# Patient Record
Sex: FEMALE | Race: WHITE | URBAN - METROPOLITAN AREA
[De-identification: names, ages, dates, MRNs, and addresses within clinical notes are randomized per-mention and may not be internally consistent; named-entity substitution may affect disease eponyms.]

---

## 2017-07-05 ENCOUNTER — HOSPITAL ENCOUNTER (OUTPATIENT)
Facility: CLINIC | Age: 82
Discharge: HOME | End: 2017-07-05
Attending: OPHTHALMOLOGY | Admitting: OPHTHALMOLOGY
Payer: COMMERCIAL

## 2017-07-05 ENCOUNTER — ANESTHESIA (OUTPATIENT)
Dept: SURGERY | Facility: CLINIC | Age: 82
End: 2017-07-05
Payer: COMMERCIAL

## 2017-07-05 ENCOUNTER — ANESTHESIA EVENT (OUTPATIENT)
Dept: SURGERY | Facility: CLINIC | Age: 82
End: 2017-07-05
Payer: COMMERCIAL

## 2017-07-05 VITALS
HEART RATE: 80 BPM | SYSTOLIC BLOOD PRESSURE: 110 MMHG | DIASTOLIC BLOOD PRESSURE: 78 MMHG | TEMPERATURE: 97.6 F | OXYGEN SATURATION: 95 % | RESPIRATION RATE: 14 BRPM

## 2017-07-05 DIAGNOSIS — H40.1113 PRIMARY OPEN ANGLE GLAUCOMA OF RIGHT EYE, SEVERE STAGE: Primary | ICD-10-CM

## 2017-07-05 PROCEDURE — 27210794 ZZH OR GENERAL SUPPLY STERILE: Performed by: OPHTHALMOLOGY

## 2017-07-05 PROCEDURE — S0020 INJECTION, BUPIVICAINE HYDRO: HCPCS | Performed by: OPHTHALMOLOGY

## 2017-07-05 PROCEDURE — 25000125 ZZHC RX 250: Performed by: ANESTHESIOLOGY

## 2017-07-05 PROCEDURE — 27210995 ZZH RX 272: Performed by: OPHTHALMOLOGY

## 2017-07-05 PROCEDURE — 36000105 ZZH EYE SURGERY LEVEL 4 EA 15 ADDTL MIN: Performed by: OPHTHALMOLOGY

## 2017-07-05 PROCEDURE — 25000128 H RX IP 250 OP 636: Performed by: NURSE ANESTHETIST, CERTIFIED REGISTERED

## 2017-07-05 PROCEDURE — 25000128 H RX IP 250 OP 636

## 2017-07-05 PROCEDURE — C1783 OCULAR IMP, AQUEOUS DRAIN DE: HCPCS | Performed by: OPHTHALMOLOGY

## 2017-07-05 PROCEDURE — 25000128 H RX IP 250 OP 636: Performed by: OPHTHALMOLOGY

## 2017-07-05 PROCEDURE — 25000125 ZZHC RX 250: Performed by: OPHTHALMOLOGY

## 2017-07-05 PROCEDURE — 36000104 ZZH EYE SURGERY LEVEL 4 1ST 30 MIN: Performed by: OPHTHALMOLOGY

## 2017-07-05 PROCEDURE — 37000009 ZZH ANESTHESIA TECHNICAL FEE, EACH ADDTL 15 MIN: Performed by: OPHTHALMOLOGY

## 2017-07-05 PROCEDURE — 37000008 ZZH ANESTHESIA TECHNICAL FEE, 1ST 30 MIN: Performed by: OPHTHALMOLOGY

## 2017-07-05 PROCEDURE — 40000170 ZZH STATISTIC PRE-PROCEDURE ASSESSMENT II: Performed by: OPHTHALMOLOGY

## 2017-07-05 PROCEDURE — 71000028 ZZH EYE RECOVERY PHASE 2 EACH 15 MINS: Performed by: OPHTHALMOLOGY

## 2017-07-05 DEVICE — EYE SHUNT EX-PRESS P MINI GLAUCOMA 50UM P-50PL: Type: IMPLANTABLE DEVICE | Site: EYE | Status: FUNCTIONAL

## 2017-07-05 RX ORDER — SODIUM CHLORIDE, SODIUM LACTATE, POTASSIUM CHLORIDE, CALCIUM CHLORIDE 600; 310; 30; 20 MG/100ML; MG/100ML; MG/100ML; MG/100ML
INJECTION, SOLUTION INTRAVENOUS CONTINUOUS
Status: CANCELLED | OUTPATIENT
Start: 2017-07-05

## 2017-07-05 RX ORDER — ONDANSETRON 2 MG/ML
INJECTION INTRAMUSCULAR; INTRAVENOUS PRN
Status: DISCONTINUED | OUTPATIENT
Start: 2017-07-05 | End: 2017-07-05

## 2017-07-05 RX ORDER — MOXIFLOXACIN 5 MG/ML
1 SOLUTION/ DROPS OPHTHALMIC 4 TIMES DAILY
Qty: 3 ML | Refills: 0 | Status: SHIPPED | OUTPATIENT
Start: 2017-07-05 | End: 2017-07-20

## 2017-07-05 RX ORDER — ATROPINE SULFATE 10 MG/ML
SOLUTION/ DROPS OPHTHALMIC PRN
Status: DISCONTINUED | OUTPATIENT
Start: 2017-07-05 | End: 2017-07-05 | Stop reason: HOSPADM

## 2017-07-05 RX ORDER — TETRACAINE HYDROCHLORIDE 5 MG/ML
SOLUTION OPHTHALMIC PRN
Status: DISCONTINUED | OUTPATIENT
Start: 2017-07-05 | End: 2017-07-05 | Stop reason: HOSPADM

## 2017-07-05 RX ORDER — BALANCED SALT SOLUTION 6.4; .75; .48; .3; 3.9; 1.7 MG/ML; MG/ML; MG/ML; MG/ML; MG/ML; MG/ML
SOLUTION OPHTHALMIC PRN
Status: DISCONTINUED | OUTPATIENT
Start: 2017-07-05 | End: 2017-07-05 | Stop reason: HOSPADM

## 2017-07-05 RX ORDER — DEXAMETHASONE SODIUM PHOSPHATE 10 MG/ML
INJECTION, SOLUTION INTRAMUSCULAR; INTRAVENOUS PRN
Status: DISCONTINUED | OUTPATIENT
Start: 2017-07-05 | End: 2017-07-05 | Stop reason: HOSPADM

## 2017-07-05 RX ORDER — PREDNISOLONE ACETATE 10 MG/ML
1 SUSPENSION/ DROPS OPHTHALMIC 4 TIMES DAILY
Qty: 10 ML | Refills: 2 | Status: ON HOLD | OUTPATIENT
Start: 2017-07-05 | End: 2017-09-28

## 2017-07-05 RX ORDER — PHENYLEPHRINE HYDROCHLORIDE 25 MG/ML
1 SOLUTION/ DROPS OPHTHALMIC
Status: COMPLETED | OUTPATIENT
Start: 2017-07-05 | End: 2017-07-05

## 2017-07-05 RX ORDER — SODIUM CHLORIDE, SODIUM LACTATE, POTASSIUM CHLORIDE, CALCIUM CHLORIDE 600; 310; 30; 20 MG/100ML; MG/100ML; MG/100ML; MG/100ML
INJECTION, SOLUTION INTRAVENOUS CONTINUOUS
Status: DISCONTINUED | OUTPATIENT
Start: 2017-07-05 | End: 2017-07-05 | Stop reason: HOSPADM

## 2017-07-05 RX ADMIN — PHENYLEPHRINE HYDROCHLORIDE 1 DROP: 2.5 SOLUTION/ DROPS OPHTHALMIC at 07:59

## 2017-07-05 RX ADMIN — MIDAZOLAM HYDROCHLORIDE 0.5 MG: 1 INJECTION, SOLUTION INTRAMUSCULAR; INTRAVENOUS at 09:55

## 2017-07-05 RX ADMIN — MIDAZOLAM HYDROCHLORIDE 0.5 MG: 1 INJECTION, SOLUTION INTRAMUSCULAR; INTRAVENOUS at 10:14

## 2017-07-05 RX ADMIN — MIDAZOLAM HYDROCHLORIDE 1 MG: 1 INJECTION, SOLUTION INTRAMUSCULAR; INTRAVENOUS at 09:50

## 2017-07-05 RX ADMIN — PHENYLEPHRINE HYDROCHLORIDE 1 DROP: 2.5 SOLUTION/ DROPS OPHTHALMIC at 08:12

## 2017-07-05 RX ADMIN — ONDANSETRON 4 MG: 2 INJECTION INTRAMUSCULAR; INTRAVENOUS at 09:50

## 2017-07-05 RX ADMIN — PHENYLEPHRINE HYDROCHLORIDE 1 DROP: 2.5 SOLUTION/ DROPS OPHTHALMIC at 08:06

## 2017-07-05 RX ADMIN — LIDOCAINE HYDROCHLORIDE 1 ML: 10 INJECTION, SOLUTION EPIDURAL; INFILTRATION; INTRACAUDAL; PERINEURAL at 08:12

## 2017-07-05 RX ADMIN — SODIUM CHLORIDE, POTASSIUM CHLORIDE, SODIUM LACTATE AND CALCIUM CHLORIDE: 600; 310; 30; 20 INJECTION, SOLUTION INTRAVENOUS at 08:14

## 2017-07-05 ASSESSMENT — ENCOUNTER SYMPTOMS: DYSRHYTHMIAS: 1

## 2017-07-05 NOTE — ANESTHESIA PREPROCEDURE EVALUATION
Anesthesia Evaluation     . Pt has had prior anesthetic.     No history of anesthetic complications          ROS/MED HX    ENT/Pulmonary:      (-) sleep apnea   Neurologic:       Cardiovascular:     (+) ----. : . . . :. dysrhythmias a-fib, .       METS/Exercise Tolerance:     Hematologic:         Musculoskeletal:         GI/Hepatic:     (+) GERD Asymptomatic on medication,       Renal/Genitourinary:         Endo:     (+) thyroid problem  hyperthyroidism, .      Psychiatric:         Infectious Disease:         Malignancy:   (+) Malignancy History of Other  Other CA endometrial cancer Remission status post Surgery and Radiation         Other:                     Physical Exam  Normal systems: dental    Airway   Mallampati: I  TM distance: >3 FB  Neck ROM: full    Dental     Cardiovascular   Rhythm and rate: irregular and normal      Pulmonary    breath sounds clear to auscultation                    Anesthesia Plan      History & Physical Review  History and physical reviewed and following examination; no interval change.    ASA Status:  3 .    NPO Status:  > 8 hours    Plan for MAC with Intravenous induction. Reason for MAC:  Procedure to face, neck, head or breast         Postoperative Care      Consents  Anesthetic plan, risks, benefits and alternatives discussed with:  Patient..                          .

## 2017-07-05 NOTE — DISCHARGE INSTRUCTIONS
[facility]est[facility] Discharge  Instructions  Anesthesia (Eye Care Center)   Adult Discharge Instructions    For 24 hours after surgery    1. Get plenty of rest.  Make arrangements to have a responsible adult stay with you for at least 6 hours after you leave the hospital.  2. Do not drive or use heavy equipment for 24 hours.    3. Do not drink alcohol for 24 hours.  4. Do not sign legal documents or make important decisions for 24 hours.  5. Avoid strenuous or risky activities. You may feel lightheaded.  If so, sit for a few minutes before standing.  Have someone help you get up.   6. Conscious sedation patients may resume a regular diet..  7. Any questions of medical nature, call your physician.        Discharge Instructions  Post-Operative Glaucoma Surgery  Dr. Melvin  [phone]    Pain Management:      Some mild discomfort is normal following surgery.  If you are currently taking any medications for pain, you may continue to take what you normally do.  Otherwise, you may take an over-the-counter medication such as Tylenol (acetaminophen) or ibuprofen (Advil) for relief.  Take as instructed on the bottle.    If you are experiencing uncontrollable pain or have other concerns, call [phone].    Other Medications:    No eye drops to the operative eye tonight.  Please remember to bring all your drops/supplies with you to your post-op appointment.  You will be instructed on the drops at that time.    Do not take glaucoma pills.    You may resume your regular home medications, including any drops you are using in your NON-operative eye.      General Rules:    Avoid strenuous activity. Do not do anything that would cause you to strain or make your face red. Open your mouth if you cough, use a laxative if necessary, do not lift greater than 5 pounds.    Keep your head above your heart.  Do not bend down lower than waist level.  Bend with your knees.    Keep the bandage over your eye.  The  doctor will remove the bandage at your appointment at the clinic.    Try to write down any questions you may have to bring to your post-op appointment.    Be restful today.

## 2017-07-05 NOTE — ANESTHESIA CARE TRANSFER NOTE
Patient: Anastasiia Eng    Procedure(s):  RIGHT EYE EXPRESS SHUNT WITH MITOMYCIN - Wound Class: I-Clean    Diagnosis: GLAUCOMA RIGHT EYE   Diagnosis Additional Information: No value filed.    Anesthesia Type:   MAC     Note:  Airway :Room Air  Patient transferred to:Phase II        Vitals: (Last set prior to Anesthesia Care Transfer)    CRNA VITALS  7/5/2017 1007 - 7/5/2017 1040      7/5/2017             Resp Rate (set): 10                Electronically Signed By: GRACIA Bray CRNA  July 5, 2017  10:40 AM

## 2017-07-05 NOTE — IP AVS SNAPSHOT
[facility]    [address]    [place] [address]    Phone:  [phone]    Fax:  [phone]                                       After Visit Summary   7/5/2017    Anastasiia Eng    MRN: [id number]           After Visit Summary Signature Page     I have received my discharge instructions, and my questions have been answered. I have discussed any challenges I see with this plan with the nurse or doctor.    ..........................................................................................................................................  Patient/Patient Representative Signature      ..........................................................................................................................................  Patient Representative Print Name and Relationship to Patient    ..................................................               ................................................  Date                                            Time    ..........................................................................................................................................  Reviewed by Signature/Title    ...................................................              ..............................................  Date                                                            Time

## 2017-07-05 NOTE — IP AVS SNAPSHOT
MRN:[id number]                      After Visit Summary   7/5/2017    Anastasiia Eng    MRN: [id number]           Thank you!     Thank you for choosing [facility] for your care. Our goal is always to provide you with excellent care. Hearing back from our patients is one way we can continue to improve our services. Please take a few minutes to complete the written survey that you may receive in the mail after you visit with us. Thank you!        Patient Information     Date Of Birth          10/13/1932        About your hospital stay     You were admitted on:  July 5, 2017 You last received care in the:  [facility]    You were discharged on:  July 5, 2017       Who to Call     For medical emergencies, please call 911.  For non-urgent questions about your medical care, please call your primary care provider or clinic, [phone]  For questions related to your surgery, please call your surgery clinic        Attending Provider     Provider Diana Mccollum MD Ophthalmology       Primary Care Provider Office Phone # Fax #    Becka Garner [phone]      After Care Instructions     Eye drops as prescribed by physician.  Start drops today unless told otherwise by the physician           May use Tylenol or Advil for pain as directed by the physician           Notify Physician if you have severe headache or nausea           Remove patch per physician instruction           Return to clinic as instructed by physician           Wear eye shield or patch as directed                 Further instructions from your care team       [facility] Anesthesia [facility] Discharge  Instructions  Anesthesia (Eye Care Center)   Adult Discharge Instructions    For 24 hours after surgery    1. Get plenty of rest.  Make arrangements to have a responsible adult stay with you for at least 6 hours after you leave the hospital.  2. Do not drive or use heavy  equipment for 24 hours.    3. Do not drink alcohol for 24 hours.  4. Do not sign legal documents or make important decisions for 24 hours.  5. Avoid strenuous or risky activities. You may feel lightheaded.  If so, sit for a few minutes before standing.  Have someone help you get up.   6. Conscious sedation patients may resume a regular diet..  7. Any questions of medical nature, call your physician.        Discharge Instructions  Post-Operative Glaucoma Surgery  Dr. Melvin  [phone]    Pain Management:      Some mild discomfort is normal following surgery.  If you are currently taking any medications for pain, you may continue to take what you normally do.  Otherwise, you may take an over-the-counter medication such as Tylenol (acetaminophen) or ibuprofen (Advil) for relief.  Take as instructed on the bottle.    If you are experiencing uncontrollable pain or have other concerns, call [phone].    Other Medications:    No eye drops to the operative eye tonight.  Please remember to bring all your drops/supplies with you to your post-op appointment.  You will be instructed on the drops at that time.    Do not take glaucoma pills.    You may resume your regular home medications, including any drops you are using in your NON-operative eye.      General Rules:    Avoid strenuous activity. Do not do anything that would cause you to strain or make your face red. Open your mouth if you cough, use a laxative if necessary, do not lift greater than 5 pounds.    Keep your head above your heart.  Do not bend down lower than waist level.  Bend with your knees.    Keep the bandage over your eye.  The doctor will remove the bandage at your appointment at the clinic.    Try to write down any questions you may have to bring to your post-op appointment.    Be restful today.    Pending Results     No orders found from 7/3/2017 to 7/6/2017.            Admission Information     Date & Time Provider Department Dept. Phone     "2017 Diana Melvin MD [facility] [place] [phone]      Your Vitals Were     Blood Pressure Pulse Temperature Respirations Pulse Oximetry       99/58 67 97.6  F (36.4  C) (Temporal) 14 96%       MyChart Information     [facility] lets you send messages to your doctor, view your test results, renew your prescriptions, schedule appointments and more. To sign up, go to [email or web address] . Click on \"Log in\" on the left side of the screen, which will take you to the Welcome page. Then click on \"Sign up Now\" on the right side of the page.     You will be asked to enter the access code listed below, as well as some personal information. Please follow the directions to create your username and password.     Your access code is: [email or web address]  Expires: 10/3/2017 10:47 AM     Your access code will  in 90 days. If you need help or a new code, please call your [place] clinic or [phone].        Care EveryWhere ID     This is your Care EveryWhere ID. This could be used by other organizations to access your [place] medical records  [id number]        Equal Access to Services     KANCHAN ROBLES AH: Hadii jameson steiner hadasho Soomaali, waaxda luqadaha, qaybta kaalmada adeegyada, waxay tori bhatia. So [facility] [phone].    ATENCIÓN: Si habla español, tiene a harmon disposición servicios gratuitos de asistencia lingüística. Llame al [phone].    We comply with applicable federal civil rights laws and [place] laws. We do not discriminate on the basis of race, color, national origin, age, disability sex, sexual orientation or gender identity.               Review of your medicines      UNREVIEWED medicines. Ask your doctor about these medicines        Dose / Directions    COSOPT 2-0.5 % ophthalmic solution   Generic drug:  dorzolamide-timolol        Dose:  1 drop   1 drop 2 times daily. To affected eye   Refills:  0       EFFEXOR XR PO        Dose:  75 mg   Take 75 mg by " mouth. Daily with food   Refills:  0       LUMIGAN 0.03 % ophthalmic solution   Generic drug:  bimatoprost        Dose:  1 drop   1 drop At Bedtime. Every evening to affected eye; 1 drop   Refills:  0       omeprazole 20 MG tablet        Dose:  20 mg   Take 20 mg by mouth daily.   Refills:  0         START taking        Dose / Directions    moxifloxacin 0.5 % ophthalmic solution   Commonly known as:  VIGAMOX   Used for:  Primary open angle glaucoma of right eye, severe stage        Dose:  1 drop   Place 1 drop into the right eye 4 times daily for 15 days   Quantity:  3 mL   Refills:  0       prednisoLONE acetate 1 % ophthalmic susp   Commonly known as:  PRED FORTE   Used for:  Primary open angle glaucoma of right eye, severe stage        Dose:  1 drop   Place 1 drop into the right eye 4 times daily   Quantity:  10 mL   Refills:  2            Where to get your medicines      These medications were sent to [facility] [place], [place] - [address]  [address], [place] [address]     Phone:  [phone]     moxifloxacin 0.5 % ophthalmic solution    prednisoLONE acetate 1 % ophthalmic susp                Protect others around you: Learn how to safely use, store and throw away your medicines at www.disposemymeds.org.             Medication List: This is a list of all your medications and when to take them. Check marks below indicate your daily home schedule. Keep this list as a reference.      Medications           Morning Afternoon Evening Bedtime As Needed    COSOPT 2-0.5 % ophthalmic solution   1 drop 2 times daily. To affected eye   Generic drug:  dorzolamide-timolol                                EFFEXOR XR PO   Take 75 mg by mouth. Daily with food                                LUMIGAN 0.03 % ophthalmic solution   1 drop At Bedtime. Every evening to affected eye; 1 drop   Generic drug:  bimatoprost                                moxifloxacin 0.5 % ophthalmic solution   Commonly known  as:  VIGAMOX   Place 1 drop into the right eye 4 times daily for 15 days                                omeprazole 20 MG tablet   Take 20 mg by mouth daily.                                prednisoLONE acetate 1 % ophthalmic susp   Commonly known as:  PRED FORTE   Place 1 drop into the right eye 4 times daily

## 2017-07-05 NOTE — OP NOTE
Patient:      Anastasiia Eng                                               Reg No:     [id number]                                               Date:          2017                                               :          10/13/1932                                                  Attending:   HOLLIE LOZANO M.D.                                                Surgeon:     HOLLIE LOZANO M.D.                                                Dictating:    HOLLIE LOZANO M.D.                                                Service Dt: Ophthalmology                                      OPERATIVE REPORT          ANESTHESIA:   Monitored anesthetic care with local and subTenon's block of mixture of two mls of lidocaine 2% and two mls of Marcaine 0.75% and hyaluronidase injected into superior subTenon s space.      PREOPERATIVE DIAGNOSIS (ES):   OAG, right eye     POSTOPERATIVE DIAGNOSIS (ES):   The same     NAME OF OPERATION:   1. Express shunt, model P-50, serial number [id number], with a mixture of 0.1 cc of mitomycin 0.2 mg/cc and 0.1 cc of sub-tenon's block, right eye     COMPLICATIONS:  None.      SPECIMENS REMOVED:   None.     INDICATIONS FOR PROCEDURE:  The patient is a 84 year old female with unsatisfactory controlled intraocular pressure on maximum medical treatment and progressive visual field loss. The benefits, alternatives and risks of the procedure including the risk of infection, inflammation, bleeding, blindness, need for another procedure or additional glaucoma medications, hypotony, elevated intraocular pressure were discussed with the patient. The patient understood the benefits, alternatives and risks associated with the procedure and has elected to proceed with the surgery.     DESCRIPTION OF PROCEDURE:     After the appropriate pre-operative consent was obtained the patient was taken to the operating room and hooked up to cardiorespiratory  monitoring by the anesthesia team.  Monitored anesthetic care was provided by the anesthesia team. The time out was taken to identify the patient, the surgery, the implant and the eye. The patient was draped in usual sterile ophthalmic fashion. Tetracaine drops were instilled into the eye. Lid speculum was placed into the eye. SubTenon block was given into inferior-nasal subtenon s space.      6-0 silk traction suture was placed into superior cornea and the eye was infraducted. The 6-0 silk suture was secured to the sterile drape with hemostat. Conjunctival peritomy was performed utilizing straight smooth forceps and Sheela's scissors 10 millimeter superior to the limbus. The conjunctiva and Tenon s were dissected towards limbus. Cautery was used to archive hemostasis.  [place] blade was used to create a partial-thickness scleral flap at the limbus. Nasal paracentesis was performed with 25 gauge needle on a TB syringe. The AC was filled with healon. 25 gauge needle on a TB syringe was used to create an entry for the Ex-PRESS shunt in to the AC. The Ex-PRESS shunt was inserted into the AC. The flap was re-positioned and secured to the sclera with two 10-0 nylon sutures, one at each corner. The AC was re-filled with healon GV. Ologen was placed on top of the scleral flap. Conjunctiva and Tenon s were closed with 8-0 vycryl. A mixture of mitomycin and sub-tenon's block was injected into superior sub-tenon's space.  At the end of the surgery the AC was deep, the bleb was forming and no leak was identified. Ceftazidime and dexamethazone injections were given in sub-Tenon's space. The lid speculum was removed. Maxitrol ointment waw placed in the eye. The eye was patched and shielded in standard ophthalmic fashion.     DISPOSITION: The patient was taken to the recovery room in stable condition having tolerated the procedure well. The patient will be given post-operative instructions and seen in the eye clinic  tomorrow.    SPONGE/INSTRUMENT/NEEDLE COUNTS:   All sponge and needle counts were correct at the end of the case.     ____________________________   Diana LOZANO M.D.

## 2017-07-05 NOTE — ANESTHESIA POSTPROCEDURE EVALUATION
Patient: Anastasiia Eng    Procedure(s):  RIGHT EYE EXPRESS SHUNT WITH MITOMYCIN - Wound Class: I-Clean    Diagnosis:GLAUCOMA RIGHT EYE   Diagnosis Additional Information: No value filed.    Anesthesia Type:  MAC    Note:  Anesthesia Post Evaluation    Patient location during evaluation: PACU  Patient participation: Able to fully participate in evaluation  Level of consciousness: awake  Pain management: adequate  Airway patency: patent  Cardiovascular status: acceptable  Respiratory status: acceptable  Hydration status: acceptable  PONV: none     Anesthetic complications: None          Last vitals:  Vitals:    07/05/17 0802 07/05/17 1042   BP: 116/50 99/58   Pulse:  67   Resp: 16 14   Temp: 36.4  C (97.6  F)    SpO2: 98% 96%         Electronically Signed By: Yudy June MD  July 5, 2017  10:48 AM

## 2017-09-26 RX ORDER — LATANOPROST 50 UG/ML
1 SOLUTION/ DROPS OPHTHALMIC AT BEDTIME
COMMUNITY

## 2017-09-26 RX ORDER — CHOLESTYRAMINE 4 G/9G
1 POWDER, FOR SUSPENSION ORAL DAILY
COMMUNITY

## 2017-09-26 RX ORDER — ESTRADIOL 0.1 MG/G
2 CREAM VAGINAL
COMMUNITY

## 2017-09-26 RX ORDER — BRIMONIDINE TARTRATE AND TIMOLOL MALEATE 2; 5 MG/ML; MG/ML
1 SOLUTION OPHTHALMIC 2 TIMES DAILY
Status: ON HOLD | COMMUNITY
End: 2018-02-12

## 2017-09-26 RX ORDER — CYANOCOBALAMIN 1000 UG/ML
1 INJECTION, SOLUTION INTRAMUSCULAR; SUBCUTANEOUS
COMMUNITY

## 2017-09-28 ENCOUNTER — ANESTHESIA EVENT (OUTPATIENT)
Dept: SURGERY | Facility: CLINIC | Age: 82
End: 2017-09-28
Payer: COMMERCIAL

## 2017-09-28 ENCOUNTER — SURGERY (OUTPATIENT)
Age: 82
End: 2017-09-28

## 2017-09-28 ENCOUNTER — ANESTHESIA (OUTPATIENT)
Dept: SURGERY | Facility: CLINIC | Age: 82
End: 2017-09-28
Payer: COMMERCIAL

## 2017-09-28 ENCOUNTER — HOSPITAL ENCOUNTER (OUTPATIENT)
Facility: CLINIC | Age: 82
Discharge: HOME | End: 2017-09-28
Attending: OPHTHALMOLOGY | Admitting: OPHTHALMOLOGY
Payer: COMMERCIAL

## 2017-09-28 VITALS
HEIGHT: 62 IN | RESPIRATION RATE: 16 BRPM | BODY MASS INDEX: 26.91 KG/M2 | WEIGHT: 146.2 LBS | TEMPERATURE: 98.1 F | OXYGEN SATURATION: 97 % | SYSTOLIC BLOOD PRESSURE: 139 MMHG | DIASTOLIC BLOOD PRESSURE: 68 MMHG

## 2017-09-28 DIAGNOSIS — H40.1113 PRIMARY OPEN ANGLE GLAUCOMA OF RIGHT EYE, SEVERE STAGE: Primary | ICD-10-CM

## 2017-09-28 PROCEDURE — 25000125 ZZHC RX 250: Performed by: ANESTHESIOLOGY

## 2017-09-28 PROCEDURE — C1783 OCULAR IMP, AQUEOUS DRAIN DE: HCPCS | Performed by: OPHTHALMOLOGY

## 2017-09-28 PROCEDURE — 40000170 ZZH STATISTIC PRE-PROCEDURE ASSESSMENT II: Performed by: OPHTHALMOLOGY

## 2017-09-28 PROCEDURE — 27210794 ZZH OR GENERAL SUPPLY STERILE: Performed by: OPHTHALMOLOGY

## 2017-09-28 PROCEDURE — 36000098 ZZH EYE SURGERY LEVEL 2 1ST 30 MIN: Performed by: OPHTHALMOLOGY

## 2017-09-28 PROCEDURE — 25000128 H RX IP 250 OP 636: Performed by: NURSE ANESTHETIST, CERTIFIED REGISTERED

## 2017-09-28 PROCEDURE — 25000128 H RX IP 250 OP 636: Performed by: OPHTHALMOLOGY

## 2017-09-28 PROCEDURE — 25000125 ZZHC RX 250: Performed by: NURSE ANESTHETIST, CERTIFIED REGISTERED

## 2017-09-28 PROCEDURE — 25000128 H RX IP 250 OP 636: Performed by: ANESTHESIOLOGY

## 2017-09-28 PROCEDURE — S0020 INJECTION, BUPIVICAINE HYDRO: HCPCS | Performed by: OPHTHALMOLOGY

## 2017-09-28 PROCEDURE — 71000028 ZZH EYE RECOVERY PHASE 2 EACH 15 MINS: Performed by: OPHTHALMOLOGY

## 2017-09-28 PROCEDURE — 37000008 ZZH ANESTHESIA TECHNICAL FEE, 1ST 30 MIN: Performed by: OPHTHALMOLOGY

## 2017-09-28 PROCEDURE — 25000125 ZZHC RX 250: Performed by: OPHTHALMOLOGY

## 2017-09-28 PROCEDURE — 37000009 ZZH ANESTHESIA TECHNICAL FEE, EACH ADDTL 15 MIN: Performed by: OPHTHALMOLOGY

## 2017-09-28 PROCEDURE — 27210995 ZZH RX 272: Performed by: OPHTHALMOLOGY

## 2017-09-28 PROCEDURE — 36000099 ZZH EYE SURGERY LEVEL 2 EA 15 ADDTL MIN: Performed by: OPHTHALMOLOGY

## 2017-09-28 DEVICE — IMP EYE OLOGEN COLLAGEN MATRIX AEON 12MMX1MM 862051: Type: IMPLANTABLE DEVICE | Site: EYE | Status: FUNCTIONAL

## 2017-09-28 RX ORDER — ACETAMINOPHEN 650 MG/1
650 SUPPOSITORY RECTAL EVERY 4 HOURS PRN
Status: DISCONTINUED | OUTPATIENT
Start: 2017-09-28 | End: 2017-09-28 | Stop reason: HOSPADM

## 2017-09-28 RX ORDER — SODIUM CHLORIDE, SODIUM LACTATE, POTASSIUM CHLORIDE, CALCIUM CHLORIDE 600; 310; 30; 20 MG/100ML; MG/100ML; MG/100ML; MG/100ML
INJECTION, SOLUTION INTRAVENOUS CONTINUOUS
Status: DISCONTINUED | OUTPATIENT
Start: 2017-09-28 | End: 2017-09-28 | Stop reason: HOSPADM

## 2017-09-28 RX ORDER — DEXAMETHASONE SODIUM PHOSPHATE 4 MG/ML
INJECTION, SOLUTION INTRA-ARTICULAR; INTRALESIONAL; INTRAMUSCULAR; INTRAVENOUS; SOFT TISSUE PRN
Status: DISCONTINUED | OUTPATIENT
Start: 2017-09-28 | End: 2017-09-28 | Stop reason: HOSPADM

## 2017-09-28 RX ORDER — ONDANSETRON 2 MG/ML
4 INJECTION INTRAMUSCULAR; INTRAVENOUS EVERY 30 MIN PRN
Status: DISCONTINUED | OUTPATIENT
Start: 2017-09-28 | End: 2017-09-28 | Stop reason: HOSPADM

## 2017-09-28 RX ORDER — FENTANYL CITRATE 50 UG/ML
25-50 INJECTION, SOLUTION INTRAMUSCULAR; INTRAVENOUS
Status: DISCONTINUED | OUTPATIENT
Start: 2017-09-28 | End: 2017-09-28 | Stop reason: HOSPADM

## 2017-09-28 RX ORDER — ONDANSETRON 4 MG/1
4 TABLET, ORALLY DISINTEGRATING ORAL EVERY 30 MIN PRN
Status: DISCONTINUED | OUTPATIENT
Start: 2017-09-28 | End: 2017-09-28 | Stop reason: HOSPADM

## 2017-09-28 RX ORDER — SODIUM CHLORIDE, SODIUM LACTATE, POTASSIUM CHLORIDE, CALCIUM CHLORIDE 600; 310; 30; 20 MG/100ML; MG/100ML; MG/100ML; MG/100ML
500 INJECTION, SOLUTION INTRAVENOUS CONTINUOUS
Status: DISCONTINUED | OUTPATIENT
Start: 2017-09-28 | End: 2017-09-28 | Stop reason: HOSPADM

## 2017-09-28 RX ORDER — NALOXONE HYDROCHLORIDE 0.4 MG/ML
.1-.4 INJECTION, SOLUTION INTRAMUSCULAR; INTRAVENOUS; SUBCUTANEOUS
Status: DISCONTINUED | OUTPATIENT
Start: 2017-09-28 | End: 2017-09-28 | Stop reason: HOSPADM

## 2017-09-28 RX ORDER — ONDANSETRON 2 MG/ML
INJECTION INTRAMUSCULAR; INTRAVENOUS PRN
Status: DISCONTINUED | OUTPATIENT
Start: 2017-09-28 | End: 2017-09-28

## 2017-09-28 RX ORDER — TETRACAINE HYDROCHLORIDE 5 MG/ML
SOLUTION OPHTHALMIC PRN
Status: DISCONTINUED | OUTPATIENT
Start: 2017-09-28 | End: 2017-09-28 | Stop reason: HOSPADM

## 2017-09-28 RX ORDER — PREDNISOLONE ACETATE 10 MG/ML
1 SUSPENSION/ DROPS OPHTHALMIC 4 TIMES DAILY
Qty: 10 ML | Refills: 2 | Status: ON HOLD | OUTPATIENT
Start: 2017-09-28 | End: 2018-02-12

## 2017-09-28 RX ORDER — MEPERIDINE HYDROCHLORIDE 25 MG/ML
12.5 INJECTION INTRAMUSCULAR; INTRAVENOUS; SUBCUTANEOUS
Status: DISCONTINUED | OUTPATIENT
Start: 2017-09-28 | End: 2017-09-28 | Stop reason: HOSPADM

## 2017-09-28 RX ORDER — PREDNISOLONE ACETATE 10 MG/ML
1 SUSPENSION/ DROPS OPHTHALMIC 4 TIMES DAILY
Qty: 6 ML | Refills: 1 | Status: SHIPPED | OUTPATIENT
Start: 2017-09-28 | End: 2017-11-27

## 2017-09-28 RX ORDER — PHENYLEPHRINE HYDROCHLORIDE 25 MG/ML
1 SOLUTION/ DROPS OPHTHALMIC
Status: COMPLETED | OUTPATIENT
Start: 2017-09-28 | End: 2017-09-28

## 2017-09-28 RX ORDER — OFLOXACIN 3 MG/ML
1 SOLUTION/ DROPS OPHTHALMIC 4 TIMES DAILY
Qty: 3 ML | Refills: 0 | Status: SHIPPED | OUTPATIENT
Start: 2017-09-28 | End: 2017-10-13

## 2017-09-28 RX ORDER — BALANCED SALT SOLUTION 6.4; .75; .48; .3; 3.9; 1.7 MG/ML; MG/ML; MG/ML; MG/ML; MG/ML; MG/ML
SOLUTION OPHTHALMIC PRN
Status: DISCONTINUED | OUTPATIENT
Start: 2017-09-28 | End: 2017-09-28 | Stop reason: HOSPADM

## 2017-09-28 RX ORDER — ALBUTEROL SULFATE 0.83 MG/ML
2.5 SOLUTION RESPIRATORY (INHALATION) EVERY 4 HOURS PRN
Status: DISCONTINUED | OUTPATIENT
Start: 2017-09-28 | End: 2017-09-28 | Stop reason: HOSPADM

## 2017-09-28 RX ADMIN — SODIUM CHLORIDE, POTASSIUM CHLORIDE, SODIUM LACTATE AND CALCIUM CHLORIDE 500 ML: 600; 310; 30; 20 INJECTION, SOLUTION INTRAVENOUS at 12:48

## 2017-09-28 RX ADMIN — LIDOCAINE HYDROCHLORIDE 1 ML: 20 INJECTION, SOLUTION EPIDURAL; INFILTRATION; INTRACAUDAL; PERINEURAL at 15:32

## 2017-09-28 RX ADMIN — NEOMYCIN SULFATE, POLYMYXIN B SULFATE, AND DEXAMETHASONE 1 TUBE: 3.5; 10000; 1 OINTMENT OPHTHALMIC at 15:34

## 2017-09-28 RX ADMIN — PHENYLEPHRINE HYDROCHLORIDE 1 DROP: 2.5 SOLUTION/ DROPS OPHTHALMIC at 12:47

## 2017-09-28 RX ADMIN — DEXAMETHASONE SODIUM PHOSPHATE 0.5 ML: 4 INJECTION, SOLUTION INTRA-ARTICULAR; INTRALESIONAL; INTRAMUSCULAR; INTRAVENOUS; SOFT TISSUE at 15:33

## 2017-09-28 RX ADMIN — CEFTAZIDIME 0.5 ML: 1 INJECTION, POWDER, FOR SOLUTION INTRAMUSCULAR; INTRAVENOUS at 15:32

## 2017-09-28 RX ADMIN — TETRACAINE HYDROCHLORIDE 2 DROP: 5 SOLUTION OPHTHALMIC at 15:33

## 2017-09-28 RX ADMIN — BALANCED SALT SOLUTION 15 ML: 6.4; .75; .48; .3; 3.9; 1.7 SOLUTION OPHTHALMIC at 15:31

## 2017-09-28 RX ADMIN — MIDAZOLAM HYDROCHLORIDE 1 MG: 1 INJECTION, SOLUTION INTRAMUSCULAR; INTRAVENOUS at 15:09

## 2017-09-28 RX ADMIN — ONDANSETRON 4 MG: 2 INJECTION INTRAMUSCULAR; INTRAVENOUS at 15:39

## 2017-09-28 RX ADMIN — DEXMEDETOMIDINE HYDROCHLORIDE 8 MCG: 100 INJECTION, SOLUTION INTRAVENOUS at 15:13

## 2017-09-28 RX ADMIN — PHENYLEPHRINE HYDROCHLORIDE 1 DROP: 2.5 SOLUTION/ DROPS OPHTHALMIC at 12:54

## 2017-09-28 RX ADMIN — PHENYLEPHRINE HYDROCHLORIDE 1 DROP: 2.5 SOLUTION/ DROPS OPHTHALMIC at 12:59

## 2017-09-28 RX ADMIN — LIDOCAINE HYDROCHLORIDE 1 ML: 10 INJECTION, SOLUTION EPIDURAL; INFILTRATION; INTRACAUDAL; PERINEURAL at 12:48

## 2017-09-28 ASSESSMENT — ENCOUNTER SYMPTOMS: DYSRHYTHMIAS: 1

## 2017-09-28 NOTE — IP AVS SNAPSHOT
MRN:[id number]                      After Visit Summary   9/28/2017    Anastasiia Eng    MRN: [id number]           Thank you!     Thank you for choosing [facility] for your care. Our goal is always to provide you with excellent care. Hearing back from our patients is one way we can continue to improve our services. Please take a few minutes to complete the written survey that you may receive in the mail after you visit with us. Thank you!        Patient Information     Date Of Birth          10/13/1932        About your hospital stay     You were admitted on:  September 28, 2017 You last received care in the:  [facility]    You were discharged on:  September 28, 2017       Who to Call     For medical emergencies, please call 911.  For non-urgent questions about your medical care, please call your primary care provider or clinic, [phone]  For questions related to your surgery, please call your surgery clinic        Attending Provider     Provider Diana Mccollum MD Ophthalmology       Primary Care Provider Office Phone # Fax #    Becka Garner [phone]      After Care Instructions     Eye drops as prescribed by physician.  Start drops today unless told otherwise by the physician           May use Tylenol or Advil for pain as directed by the physician           Notify Physician if you have severe headache or nausea           Remove patch per physician instruction           Return to clinic as instructed by physician           Wear eye shield or patch as directed                 Further instructions from your care team           Discharge Instructions  Post-Operative Glaucoma Surgery  Dr. Melvin  [phone]    Pain Management:      Some mild discomfort is normal following surgery.  If you are currently taking any medications for pain, you may continue to take what you normally do.  Otherwise, you may take an over-the-counter medication  such as Tylenol (acetaminophen) or ibuprofen (Advil) for relief.  Take as instructed on the bottle.    If you are experiencing uncontrollable pain or have other concerns, call [phone].    Other Medications:    No eye drops to the operative eye tonight.  Please remember to bring all your drops/supplies with you to your post-op appointment.  You will be instructed on the drops at that time.    Do not take glaucoma pills.    You may resume your regular home medications, including any drops you are using in your NON-operative eye.      General Rules:    Avoid strenuous activity. Do not do anything that would cause you to strain or make your face red. Open your mouth if you cough, use a laxative if necessary, do not lift greater than 5 pounds.    Keep your head above your heart.  Do not bend down lower than waist level.  Bend with your knees.    Keep the bandage over your eye until tomorrow morning and start using your eye drops.     Try to write down any questions you may have to bring to your post-op appointment.    Be restful today.    [facility] Discharge  Instructions  Anesthesia (Eye Care Center)   Adult Discharge Instructions    For 24 hours after surgery    1. Get plenty of rest.  Make arrangements to have a responsible adult stay with you for at least 6 hours after you leave the hospital.  2. Do not drive or use heavy equipment for 24 hours.    3. Do not drink alcohol for 24 hours.  4. Do not sign legal documents or make important decisions for 24 hours.  5. Avoid strenuous or risky activities. You may feel lightheaded.  If so, sit for a few minutes before standing.  Have someone help you get up.   6. Conscious sedation patients may resume a regular diet..  7. Any questions of medical nature, call your physician.    Warfarin Instruction     You have started taking a medicine called warfarin. This is a blood-thinning medicine (anticoagulant). It helps prevent and treat  "blood clots.      Before leaving the hospital, make sure you know how much to take and how long to take it.      You will need regular blood tests to make sure your blood is clotting safely. It is very important to see your doctor for regular blood tests.    Talk to your doctor before taking any new medicine (this includes over-the-counter drugs and herbal products). Many medicines can interact with warfarin. This may cause more bleeding or too much clotting.     Eating a lot of vitamin K--found in green, leafy vegetables--can change the way warfarin works in your body. Do NOT avoid these foods. Instead, try to eat the same amount each day.     Bleeding is the most common side-effect of warfarin. You may notice bleeding gums, a bloody nose, bruises and bleeding longer when you cut yourself. See a doctor at once if:   o You cough up blood  o You find blood in your stool (poop)  o You have a deep cut, or a cut that bleeds longer than 10 minutes   o You have a bad cut, hard fall, accident or hit your head (go to urgent care or the emergency room).    For women who can get pregnant: This medicine can harm an unborn baby. Be very careful not to get pregnant while taking this medicine. If you think you might be pregnant, call your doctor right away.    For more information, read \"Guide to Warfarin Therapy,  the booklet you received in the hospital.        Pending Results     No orders found from 9/26/2017 to 9/29/2017.            Admission Information     Date & Time Provider Department Dept. Phone    9/28/2017 Diana Melvin MD [facility] [phone]      Your Vitals Were     Blood Pressure Temperature Respirations Height Weight Pulse Oximetry    139/68 98.1  F (36.7  C) (Temporal) 16 1.562 m (5' 1.5\") 66.3 kg (146 lb 3.2 oz) 97%    BMI (Body Mass Index)                   27.18 kg/m2           MyChart Information     [facility] lets you send messages to your doctor, view your test results, " "renew your prescriptions, schedule appointments and more. To sign up, go to [email or web address] . Click on \"Log in\" on the left side of the screen, which will take you to the Welcome page. Then click on \"Sign up Now\" on the right side of the page.     You will be asked to enter the access code listed below, as well as some personal information. Please follow the directions to create your username and password.     Your access code is: [email or web address]  Expires: 10/3/2017 10:47 AM     Your access code will  in 90 days. If you need help or a new code, please call your [facility] clinic or [phone].        Care EveryWhere ID     This is your Care EveryWhere ID. This could be used by other organizations to access your [facility] medical records  [id number]        Equal Access to Services     KANCHAN ROBLES : Dulce Maria Gallo, esha henderson, michele kaalmalaverne funez, aysha almaguer . So [facility] [phone].    ATENCIÓN: Si habla español, tiene a harmon disposición servicios gratuitos de asistencia lingüística. Rasta al [phone].    We comply with applicable federal civil rights laws and [place] laws. We do not discriminate on the basis of race, color, national origin, age, disability sex, sexual orientation or gender identity.               Review of your medicines      UNREVIEWED medicines. Ask your doctor about these medicines        Dose / Directions    brimonidine-timolol 0.2-0.5 % ophthalmic solution   Commonly known as:  COMBIGAN        Dose:  1 drop   1 drop 2 times daily   Refills:  0       cholestyramine 4 G Packet   Commonly known as:  QUESTRAN        Dose:  1 packet   Take 1 packet by mouth 2 times daily (with meals)   Refills:  0       COSOPT 2-0.5 % ophthalmic solution   Generic drug:  dorzolamide-timolol        Dose:  1 drop   1 drop 2 times daily. To affected eye   Refills:  0       cyanocobalamin 1000 MCG/ML injection   Commonly known as:  VITAMIN B12        " Dose:  1 mL   Inject 1 mL into the muscle every 30 days   Refills:  0       EFFEXOR XR PO        Dose:  75 mg   Take 75 mg by mouth. Daily with food   Refills:  0       estradiol 0.1 MG/GM cream   Commonly known as:  ESTRACE        Dose:  2 g   Place 2 g vaginally twice a week   Refills:  0       latanoprost 0.005 % ophthalmic solution   Commonly known as:  XALATAN        Dose:  1 drop   1 drop At Bedtime   Refills:  0       LUMIGAN 0.03 % ophthalmic solution   Generic drug:  bimatoprost        Dose:  1 drop   1 drop At Bedtime. Every evening to affected eye; 1 drop   Refills:  0       METHIMAZOLE PO        Dose:  5 mg   Take 5 mg by mouth every other day   Refills:  0       METOPROLOL SUCCINATE ER PO        Dose:  25 mg   Take 25 mg by mouth daily   Refills:  0       omeprazole 20 MG tablet        Dose:  20 mg   Take 20 mg by mouth daily.   Refills:  0       RANITIDINE HCL PO        Dose:  300 mg   Take 300 mg by mouth daily   Refills:  0       WARFARIN SODIUM PO        Dose:  5 mg   Take 5 mg by mouth daily 5 mg on Mon, Wed, Fri 1/2 tab all other days   Refills:  0         START taking        Dose / Directions    ofloxacin 0.3 % ophthalmic solution   Commonly known as:  OCUFLOX   Used for:  Primary open angle glaucoma of right eye, severe stage        Dose:  1 drop   Place 1 drop into the right eye 4 times daily for 15 days   Quantity:  3 mL   Refills:  0         CONTINUE these medicines which may have CHANGED, or have new prescriptions. If we are uncertain of the size of tablets/capsules you have at home, strength may be listed as something that might have changed.        Dose / Directions    * prednisoLONE acetate 1 % ophthalmic susp   Commonly known as:  PRED FORTE   This may have changed:  You were already taking a medication with the same name, and this prescription was added. Make sure you understand how and when to take each.   Used for:  Primary open angle glaucoma of right eye, severe stage        Dose:   1 drop   Place 1 drop into the right eye 4 times daily   Quantity:  6 mL   Refills:  1       * prednisoLONE acetate 1 % ophthalmic susp   Commonly known as:  PRED FORTE   This may have changed:  Another medication with the same name was added. Make sure you understand how and when to take each.   Used for:  Primary open angle glaucoma of right eye, severe stage        Dose:  1 drop   Place 1 drop into the right eye 4 times daily   Quantity:  10 mL   Refills:  2       * Notice:  This list has 2 medication(s) that are the same as other medications prescribed for you. Read the directions carefully, and ask your doctor or other care provider to review them with you.         Where to get your medicines      These medications were sent to [facility] [place] - [place], [place] - [address]  [address], [place] [address]     Phone:  [phone]     ofloxacin 0.3 % ophthalmic solution    prednisoLONE acetate 1 % ophthalmic susp    prednisoLONE acetate 1 % ophthalmic susp                Protect others around you: Learn how to safely use, store and throw away your medicines at www.disposemymeds.org.             Medication List: This is a list of all your medications and when to take them. Check marks below indicate your daily home schedule. Keep this list as a reference.      Medications           Morning Afternoon Evening Bedtime As Needed    brimonidine-timolol 0.2-0.5 % ophthalmic solution   Commonly known as:  COMBIGAN   1 drop 2 times daily                                cholestyramine 4 G Packet   Commonly known as:  QUESTRAN   Take 1 packet by mouth 2 times daily (with meals)                                COSOPT 2-0.5 % ophthalmic solution   1 drop 2 times daily. To affected eye   Generic drug:  dorzolamide-timolol                                cyanocobalamin 1000 MCG/ML injection   Commonly known as:  VITAMIN B12   Inject 1 mL into the muscle every 30 days                                EFFEXOR  XR PO   Take 75 mg by mouth. Daily with food                                estradiol 0.1 MG/GM cream   Commonly known as:  ESTRACE   Place 2 g vaginally twice a week                                latanoprost 0.005 % ophthalmic solution   Commonly known as:  XALATAN   1 drop At Bedtime                                LUMIGAN 0.03 % ophthalmic solution   1 drop At Bedtime. Every evening to affected eye; 1 drop   Generic drug:  bimatoprost                                METHIMAZOLE PO   Take 5 mg by mouth every other day                                METOPROLOL SUCCINATE ER PO   Take 25 mg by mouth daily                                ofloxacin 0.3 % ophthalmic solution   Commonly known as:  OCUFLOX   Place 1 drop into the right eye 4 times daily for 15 days                                omeprazole 20 MG tablet   Take 20 mg by mouth daily.                                * prednisoLONE acetate 1 % ophthalmic susp   Commonly known as:  PRED FORTE   Place 1 drop into the right eye 4 times daily                                * prednisoLONE acetate 1 % ophthalmic susp   Commonly known as:  PRED FORTE   Place 1 drop into the right eye 4 times daily                                RANITIDINE HCL PO   Take 300 mg by mouth daily                                WARFARIN SODIUM PO   Take 5 mg by mouth daily 5 mg on Mon, Wed, Fri 1/2 tab all other days                                * Notice:  This list has 2 medication(s) that are the same as other medications prescribed for you. Read the directions carefully, and ask your doctor or other care provider to review them with you.

## 2017-09-28 NOTE — OP NOTE
Patient:      Anastasiia Eng                                               Reg No:     [id number]                                               Date:          2017                                               :          10/13/1932                                                  Attending:   HOLLIE LOZANO M.D.                                                Surgeon:     HOLLIE LOZANO M.D.                                                Dictating:    HOLLIE LOZANO M.D.                                                Service Dt: Ophthalmology                                      OPERATIVE REPORT          ANESTHESIA:   Monitored anesthetic care with local and subconjunctival block of mixture of two mls of lidocaine 2% and two mls of Marcaine 0.75% and hyaluronidase injected into the subconjunctival space around the bleb.      PREOPERATIVE DIAGNOSIS (ES):   1. OAG, right eye  2. Persistent bleb leak, right eye     POSTOPERATIVE DIAGNOSIS (ES):   The same     NAME OF OPERATION:   1. Bleb revision with conjunctival advacement and ologen, right eye     COMPLICATIONS:  None.      SPECIMENS REMOVED:   None.     INDICATIONS FOR PROCEDURE:  The patient is a 84 year old female with persistent bleb leak, right eye.  The benefits, alternatives and risks of the procedure including the risk of infection, inflammation, bleeding, blindness, need for another procedure or additional glaucoma medications, hypotony, elevated intraocular pressure were discussed with the patient. The patient understood the benefits, alternatives and risks associated with the procedure and has elected to proceed with the surgery.     DESCRIPTION OF PROCEDURE:   After the appropriate pre-operative consent was obtained the patient was taken to the operating room and hooked up to cardiorespiratory monitoring by the anesthesia team.  Monitored anesthetic care was provided by the anesthesia team. The time out was taken to identify the  patient, the surgery, the implant and the eye. The patient was draped in usual sterile ophthalmic fashion. Tetracaine drops were instilled into the eye. Lid speculum was placed into the eye. Subconjunctival block was given into superior subconjunctival space.       6-0 silk traction suture was placed into superior cornea and the eye was infraducted. The 6-0 silk suture was secured to the sterile drape with hemostat. Conjunctiva and Tenon's were dissected around the bleb. 64 blade was used to de-epithelize the surface of the bleb. Ologen was placed on the bleb. Conjunctiva and Tenon's were brought forward to cover the bleb and sutured the limbus with 8-0 vycryl sutures.   Fluorescein strip wetted with balanced salt solution was used to paint conjunctival incision to identify any leak. Following that fluorescein was washed off with balanced salt solution.     At the end of the surgery the AC was deep, and no leak was identified. Ceftazidime injection were given in sub-conjunctival space. The lid speculum was removed. The eye was patched and shielded in standard ophthalmic fashion.    DISPOSITION: The patient was taken to the recovery room in stable condition having tolerated the procedure well. The patient will be given post-operative instructions and seen in the eye clinic tomorrow.    SPONGE/INSTRUMENT/NEEDLE COUNTS:   All sponge and needle counts were correct at the end of the case.     ____________________________   Diana LOZANO M.D.

## 2017-09-28 NOTE — ANESTHESIA POSTPROCEDURE EVALUATION
Patient: Anastasiia Eng    Procedure(s):  REVISION BLEB RIGHT EYE, WITH OLOGEN AND CONJUNCTIVAL ADVANCEMENT, RIGHT EYE - Wound Class: I-Clean    Diagnosis:glaucoma  Diagnosis Additional Information: No value filed.    Anesthesia Type:  MAC    Note:  Anesthesia Post Evaluation    Patient location during evaluation: PACU  Patient participation: Able to fully participate in evaluation  Level of consciousness: awake  Pain management: adequate  Airway patency: patent  Cardiovascular status: acceptable  Respiratory status: acceptable  Hydration status: acceptable  PONV: none     Anesthetic complications: None          Last vitals:  Vitals:    09/28/17 1256 09/28/17 1548 09/28/17 1604   BP: 144/72 120/61 139/68   Resp: 16 16 16   Temp: 36.7  C (98.1  F)     SpO2: 99% 96% 97%         Electronically Signed By: Ruy Reed MD  September 28, 2017  4:50 PM

## 2017-09-28 NOTE — ANESTHESIA CARE TRANSFER NOTE
Patient: Anastasiia Eng    Procedure(s):  REVISION BLEB RIGHT EYE, WITH OLOGEN AND CONJUNCTIVAL ADVANCEMENT, RIGHT EYE - Wound Class: I-Clean    Diagnosis: glaucoma  Diagnosis Additional Information: No value filed.    Anesthesia Type:   MAC     Note:  Airway :Room Air  Patient transferred to:PACU      Transferred to [facility] recovery room in recliner with armrests up, spontaneous respirations, O2 saturation maintained greater than 95% with oxygen via room air. All monitors and alarms on and functioning, clinically stable vital signs. Report given to recovery RN and questions answered. Patient alert and following verbal directions.    Electronically Signed By: GRACIA Gleason CRNA  September 28, 2017  3:49 PM

## 2017-09-28 NOTE — IP AVS SNAPSHOT
[facility]    [address]    [place] [address]    Phone:  [phone]    Fax:  [phone]                                       After Visit Summary   9/28/2017    Anastasiia Eng    MRN: [id number]           After Visit Summary Signature Page     I have received my discharge instructions, and my questions have been answered. I have discussed any challenges I see with this plan with the nurse or doctor.    ..........................................................................................................................................  Patient/Patient Representative Signature      ..........................................................................................................................................  Patient Representative Print Name and Relationship to Patient    ..................................................               ................................................  Date                                            Time    ..........................................................................................................................................  Reviewed by Signature/Title    ...................................................              ..............................................  Date                                                            Time

## 2017-09-28 NOTE — DISCHARGE INSTRUCTIONS
Discharge Instructions  Post-Operative Glaucoma Surgery  Dr. Melvin  [phone]    Pain Management:      Some mild discomfort is normal following surgery.  If you are currently taking any medications for pain, you may continue to take what you normally do.  Otherwise, you may take an over-the-counter medication such as Tylenol (acetaminophen) or ibuprofen (Advil) for relief.  Take as instructed on the bottle.    If you are experiencing uncontrollable pain or have other concerns, call [phone].    Other Medications:    No eye drops to the operative eye tonight.  Please remember to bring all your drops/supplies with you to your post-op appointment.  You will be instructed on the drops at that time.    Do not take glaucoma pills.    You may resume your regular home medications, including any drops you are using in your NON-operative eye.      General Rules:    Avoid strenuous activity. Do not do anything that would cause you to strain or make your face red. Open your mouth if you cough, use a laxative if necessary, do not lift greater than 5 pounds.    Keep your head above your heart.  Do not bend down lower than waist level.  Bend with your knees.    Keep the bandage over your eye until tomorrow morning and start using your eye drops.     Try to write down any questions you may have to bring to your post-op appointment.    Be restful today.    [facility] Anesthesia [facility] Discharge  Instructions  Anesthesia (Eye Care Center)   Adult Discharge Instructions    For 24 hours after surgery    1. Get plenty of rest.  Make arrangements to have a responsible adult stay with you for at least 6 hours after you leave the hospital.  2. Do not drive or use heavy equipment for 24 hours.    3. Do not drink alcohol for 24 hours.  4. Do not sign legal documents or make important decisions for 24 hours.  5. Avoid strenuous or risky activities. You may feel lightheaded.  If so, sit for a few minutes before  standing.  Have someone help you get up.   6. Conscious sedation patients may resume a regular diet..  7. Any questions of medical nature, call your physician.

## 2017-09-28 NOTE — ANESTHESIA PREPROCEDURE EVALUATION
Anesthesia Evaluation     . Pt has had prior anesthetic.     No history of anesthetic complications          ROS/MED HX    ENT/Pulmonary:      (-) sleep apnea   Neurologic: Comment: CLBP      Cardiovascular: Comment: LVH    (+) ----. Taking blood thinners : . . . :. dysrhythmias a-fib, valvular problems/murmurs . pulmonary hypertension,      Irregular Heartbeat/Palpitations: mod TR.   METS/Exercise Tolerance:     Hematologic:         Musculoskeletal:   (+) arthritis, , , -       GI/Hepatic: Comment: Radiation enteritis     (+) GERD Asymptomatic on medication,       Renal/Genitourinary:         Endo: Comment: Inc Ca++    (+) thyroid problem .      Psychiatric:         Infectious Disease:         Malignancy:   (+) Malignancy   Endometrial CA        Other:                     Physical Exam      Airway   Mallampati: II  TM distance: >3 FB  Neck ROM: full    Dental   (+) chipped    Cardiovascular   Rhythm and rate: irregular      Pulmonary    breath sounds clear to auscultation                    Anesthesia Plan      History & Physical Review  History and physical reviewed and following examination; no interval change.    ASA Status:  3 .        Plan for MAC     OK to precede after 14:40      Postoperative Care      Consents  Anesthetic plan, risks, benefits and alternatives discussed with:  Patient..                          .  Procedure: Procedure(s):  REVISE GLAUCOMA BLEB  Preop diagnosis: glaucoma    No Known Allergies  Past Medical History:   Diagnosis Date     Arrhythmia     persitant afib     Cholelithiasis      Cholelithiasis      Crohn's      Endometrial adenocarcinoma (H)      Gastro-oesophageal reflux disease      Glaucoma      Hypercalcemia       (normal spontaneous vaginal delivery)      Osteoporosis      Radiation      Thyroid nodule      Past Surgical History:   Procedure Laterality Date     APPENDECTOMY       CHOLECYSTECTOMY       COLONOSCOPY       ENT SURGERY      tonsillectomy     EYE SURGERY        GENITOURINARY SURGERY      TOT for stress incontinence, urethra filler     GYN SURGERY       REPAIR PTOSIS BILATERAL  3/9/2012    Procedure:REPAIR PTOSIS BILATERAL; BILATERAL PTOSIS AND MECHANICAL PTOSIS REPAIR; Surgeon:MAR NORRIS; Location:[facility]     Prior to Admission medications    Medication Sig Start Date End Date Taking? Authorizing Provider   prednisoLONE acetate (PRED FORTE) 1 % ophthalmic susp Place 1 drop into the right eye 4 times daily 9/28/17 11/27/17 Yes Diana Melvin MD   prednisoLONE acetate (PRED FORTE) 1 % ophthalmic susp Place 1 drop into the right eye 4 times daily 9/28/17  Yes Diana Melvin MD   cholestyramine (QUESTRAN) 4 G Packet Take 1 packet by mouth 2 times daily (with meals)   Yes Reported, Patient   brimonidine-timolol (COMBIGAN) 0.2-0.5 % ophthalmic solution 1 drop 2 times daily   Yes Reported, Patient   latanoprost (XALATAN) 0.005 % ophthalmic solution 1 drop At Bedtime   Yes Reported, Patient   cyanocobalamin (VITAMIN B12) 1000 MCG/ML injection Inject 1 mL into the muscle every 30 days   Yes Reported, Patient   METHIMAZOLE PO Take 5 mg by mouth every other day   Yes Reported, Patient   METOPROLOL SUCCINATE ER PO Take 25 mg by mouth daily   Yes Reported, Patient   RANITIDINE HCL PO Take 300 mg by mouth daily   Yes Reported, Patient   WARFARIN SODIUM PO Take 5 mg by mouth daily 5 mg on Mon, Wed, Fri  1/2 tab all other days   Yes Reported, Patient   Venlafaxine HCl (EFFEXOR XR PO) Take 75 mg by mouth. Daily with food    Yes Reported, Patient   dorzolamide-timolol (COSOPT) 2-0.5 % ophthalmic solution 1 drop 2 times daily. To affected eye    Yes Reported, Patient   bimatoprost (LUMIGAN) 0.03 % ophthalmic drops 1 drop At Bedtime. Every evening to affected eye; 1 drop    Yes Reported, Patient   estradiol (ESTRACE) 0.1 MG/GM cream Place 2 g vaginally twice a week    Reported, Patient   omeprazole 20 MG tablet Take 20 mg by mouth daily.    Reported, Patient      Current Facility-Administered Medications Ordered in Epic   Medication Dose Route Frequency Last Rate Last Dose     lactated ringers infusion   Intravenous Continuous         bupivacaine 0.75% (pf) 4.5mL + lidocaine 2% (pf) 4.5mL + hyaluronidase (HYLENEX) 150 units   Retrobulbar Once         lidocaine 1 % 1 mL  1 mL Other Q1H PRN   1 mL at 09/28/17 1248     lactated ringers infusion  500 mL Intravenous Continuous 25 mL/hr at 09/28/17 1248 500 mL at 09/28/17 1248     Current Outpatient Prescriptions Ordered in Epic   Medication     prednisoLONE acetate (PRED FORTE) 1 % ophthalmic susp     prednisoLONE acetate (PRED FORTE) 1 % ophthalmic susp     Wt Readings from Last 1 Encounters:   09/26/17 66.3 kg (146 lb 3.2 oz)     Temp Readings from Last 1 Encounters:   09/28/17 36.7  C (98.1  F) (Temporal)     BP Readings from Last 6 Encounters:   09/28/17 144/72   07/05/17 110/78   03/09/12 141/69     Pulse Readings from Last 4 Encounters:   07/05/17 80     Resp Readings from Last 1 Encounters:   09/28/17 16     SpO2 Readings from Last 1 Encounters:   09/28/17 99%     No results for input(s): NA, POTASSIUM, CHLORIDE, CO2, ANIONGAP, GLC, BUN, CR, AYSHA in the last 14953 hours.  No results for input(s): AST, ALT in the last 17714 hours.    Invalid input(s): ALP, BILT, LPSE  No results for input(s): WBC, HGB, PLT in the last 22649 hours.  No results for input(s): INR in the last 21919 hours.    Invalid input(s): APTT   No results for input(s): TROPI in the last 72649 hours.  RECENT LABS:   ECG:   ECHO:   CXR:

## 2018-02-12 ENCOUNTER — ANESTHESIA (OUTPATIENT)
Dept: SURGERY | Facility: CLINIC | Age: 83
End: 2018-02-12
Payer: COMMERCIAL

## 2018-02-12 ENCOUNTER — SURGERY (OUTPATIENT)
Age: 83
End: 2018-02-12

## 2018-02-12 ENCOUNTER — HOSPITAL ENCOUNTER (OUTPATIENT)
Facility: CLINIC | Age: 83
Discharge: HOME | End: 2018-02-12
Attending: OPHTHALMOLOGY | Admitting: OPHTHALMOLOGY
Payer: COMMERCIAL

## 2018-02-12 ENCOUNTER — ANESTHESIA EVENT (OUTPATIENT)
Dept: SURGERY | Facility: CLINIC | Age: 83
End: 2018-02-12
Payer: COMMERCIAL

## 2018-02-12 VITALS
WEIGHT: 141 LBS | BODY MASS INDEX: 25.95 KG/M2 | TEMPERATURE: 97 F | OXYGEN SATURATION: 97 % | DIASTOLIC BLOOD PRESSURE: 74 MMHG | HEART RATE: 67 BPM | HEIGHT: 62 IN | RESPIRATION RATE: 16 BRPM | SYSTOLIC BLOOD PRESSURE: 150 MMHG

## 2018-02-12 DIAGNOSIS — H40.1113 PRIMARY OPEN ANGLE GLAUCOMA OF RIGHT EYE, SEVERE STAGE: Primary | ICD-10-CM

## 2018-02-12 PROCEDURE — 25000128 H RX IP 250 OP 636: Performed by: NURSE ANESTHETIST, CERTIFIED REGISTERED

## 2018-02-12 PROCEDURE — 37000009 ZZH ANESTHESIA TECHNICAL FEE, EACH ADDTL 15 MIN: Performed by: OPHTHALMOLOGY

## 2018-02-12 PROCEDURE — 25000128 H RX IP 250 OP 636: Performed by: ANESTHESIOLOGY

## 2018-02-12 PROCEDURE — 36000105 ZZH EYE SURGERY LEVEL 4 EA 15 ADDTL MIN: Performed by: OPHTHALMOLOGY

## 2018-02-12 PROCEDURE — 25000125 ZZHC RX 250: Performed by: ANESTHESIOLOGY

## 2018-02-12 PROCEDURE — 40000170 ZZH STATISTIC PRE-PROCEDURE ASSESSMENT II: Performed by: OPHTHALMOLOGY

## 2018-02-12 PROCEDURE — 27210794 ZZH OR GENERAL SUPPLY STERILE: Performed by: OPHTHALMOLOGY

## 2018-02-12 PROCEDURE — 25000128 H RX IP 250 OP 636: Performed by: OPHTHALMOLOGY

## 2018-02-12 PROCEDURE — 71000028 ZZH EYE RECOVERY PHASE 2 EACH 15 MINS: Performed by: OPHTHALMOLOGY

## 2018-02-12 PROCEDURE — C1762 CONN TISS, HUMAN(INC FASCIA): HCPCS | Performed by: OPHTHALMOLOGY

## 2018-02-12 PROCEDURE — 25000125 ZZHC RX 250: Performed by: OPHTHALMOLOGY

## 2018-02-12 PROCEDURE — 25000125 ZZHC RX 250: Performed by: NURSE ANESTHETIST, CERTIFIED REGISTERED

## 2018-02-12 PROCEDURE — C1783 OCULAR IMP, AQUEOUS DRAIN DE: HCPCS | Performed by: OPHTHALMOLOGY

## 2018-02-12 PROCEDURE — 36000104 ZZH EYE SURGERY LEVEL 4 1ST 30 MIN: Performed by: OPHTHALMOLOGY

## 2018-02-12 PROCEDURE — 37000008 ZZH ANESTHESIA TECHNICAL FEE, 1ST 30 MIN: Performed by: OPHTHALMOLOGY

## 2018-02-12 DEVICE — IMPLANTABLE DEVICE: Type: IMPLANTABLE DEVICE | Site: EYE | Status: FUNCTIONAL

## 2018-02-12 DEVICE — EYE IMP GRAFT VISIONGRAFT CORNEA 1/2MOON 9MM CO301AL-90: Type: IMPLANTABLE DEVICE | Site: EYE | Status: FUNCTIONAL

## 2018-02-12 RX ORDER — TETRACAINE HYDROCHLORIDE 5 MG/ML
SOLUTION OPHTHALMIC PRN
Status: DISCONTINUED | OUTPATIENT
Start: 2018-02-12 | End: 2018-02-12 | Stop reason: HOSPADM

## 2018-02-12 RX ORDER — TRIAMCINOLONE ACETONIDE 40 MG/ML
INJECTION, SUSPENSION INTRA-ARTICULAR; INTRAMUSCULAR PRN
Status: DISCONTINUED | OUTPATIENT
Start: 2018-02-12 | End: 2018-02-12 | Stop reason: HOSPADM

## 2018-02-12 RX ORDER — BALANCED SALT SOLUTION 6.4; .75; .48; .3; 3.9; 1.7 MG/ML; MG/ML; MG/ML; MG/ML; MG/ML; MG/ML
SOLUTION OPHTHALMIC PRN
Status: DISCONTINUED | OUTPATIENT
Start: 2018-02-12 | End: 2018-02-12 | Stop reason: HOSPADM

## 2018-02-12 RX ORDER — FLURBIPROFEN SODIUM 0.3 MG/ML
1 SOLUTION/ DROPS OPHTHALMIC
Status: DISCONTINUED | OUTPATIENT
Start: 2018-02-12 | End: 2018-02-12 | Stop reason: HOSPADM

## 2018-02-12 RX ORDER — SODIUM CHLORIDE, SODIUM LACTATE, POTASSIUM CHLORIDE, CALCIUM CHLORIDE 600; 310; 30; 20 MG/100ML; MG/100ML; MG/100ML; MG/100ML
INJECTION, SOLUTION INTRAVENOUS CONTINUOUS
Status: DISCONTINUED | OUTPATIENT
Start: 2018-02-12 | End: 2018-02-12 | Stop reason: HOSPADM

## 2018-02-12 RX ORDER — PHENYLEPHRINE HYDROCHLORIDE 25 MG/ML
1 SOLUTION/ DROPS OPHTHALMIC
Status: COMPLETED | OUTPATIENT
Start: 2018-02-12 | End: 2018-02-12

## 2018-02-12 RX ORDER — MOXIFLOXACIN 5 MG/ML
1 SOLUTION/ DROPS OPHTHALMIC 4 TIMES DAILY
Qty: 3 ML | Refills: 0 | Status: SHIPPED | OUTPATIENT
Start: 2018-02-12 | End: 2018-02-27

## 2018-02-12 RX ORDER — PROPARACAINE HYDROCHLORIDE 5 MG/ML
1 SOLUTION/ DROPS OPHTHALMIC ONCE
Status: COMPLETED | OUTPATIENT
Start: 2018-02-12 | End: 2018-02-12

## 2018-02-12 RX ADMIN — FLURBIPROFEN SODIUM 1 DROP: 0.3 SOLUTION/ DROPS OPHTHALMIC at 12:56

## 2018-02-12 RX ADMIN — MIDAZOLAM 0.5 MG: 1 INJECTION INTRAMUSCULAR; INTRAVENOUS at 14:59

## 2018-02-12 RX ADMIN — PROPARACAINE HYDROCHLORIDE 1 DROP: 5 SOLUTION/ DROPS OPHTHALMIC at 12:44

## 2018-02-12 RX ADMIN — NEOMYCIN SULFATE, POLYMYXIN B SULFATE, AND DEXAMETHASONE 1 TUBE: 3.5; 10000; 1 OINTMENT OPHTHALMIC at 15:48

## 2018-02-12 RX ADMIN — TETRACAINE HYDROCHLORIDE 2 DROP: 5 SOLUTION OPHTHALMIC at 15:22

## 2018-02-12 RX ADMIN — FLURBIPROFEN SODIUM 1 DROP: 0.3 SOLUTION/ DROPS OPHTHALMIC at 12:51

## 2018-02-12 RX ADMIN — DEXMEDETOMIDINE HYDROCHLORIDE 8 MCG: 100 INJECTION, SOLUTION INTRAVENOUS at 14:59

## 2018-02-12 RX ADMIN — BALANCED SALT SOLUTION 15 ML: 6.4; .75; .48; .3; 3.9; 1.7 SOLUTION OPHTHALMIC at 15:22

## 2018-02-12 RX ADMIN — CEFUROXIME 0.1 ML: 1.5 INJECTION, POWDER, FOR SOLUTION INTRAVENOUS at 15:48

## 2018-02-12 RX ADMIN — SODIUM CHLORIDE, POTASSIUM CHLORIDE, SODIUM LACTATE AND CALCIUM CHLORIDE: 600; 310; 30; 20 INJECTION, SOLUTION INTRAVENOUS at 13:05

## 2018-02-12 RX ADMIN — PHENYLEPHRINE HYDROCHLORIDE 1 DROP: 2.5 SOLUTION/ DROPS OPHTHALMIC at 12:44

## 2018-02-12 RX ADMIN — PHENYLEPHRINE HYDROCHLORIDE 1 DROP: 2.5 SOLUTION/ DROPS OPHTHALMIC at 12:51

## 2018-02-12 RX ADMIN — TRIAMCINOLONE ACETONIDE 20 MG: 40 INJECTION, SUSPENSION INTRA-ARTICULAR; INTRAMUSCULAR at 15:48

## 2018-02-12 RX ADMIN — FLURBIPROFEN SODIUM 1 DROP: 0.3 SOLUTION/ DROPS OPHTHALMIC at 12:44

## 2018-02-12 RX ADMIN — LIDOCAINE HYDROCHLORIDE 0.5 ML: 10 INJECTION, SOLUTION EPIDURAL; INFILTRATION; INTRACAUDAL; PERINEURAL at 13:05

## 2018-02-12 RX ADMIN — LIDOCAINE HYDROCHLORIDE 1 ML: 20 INJECTION, SOLUTION EPIDURAL; INFILTRATION; INTRACAUDAL; PERINEURAL at 16:01

## 2018-02-12 RX ADMIN — PHENYLEPHRINE HYDROCHLORIDE 1 DROP: 2.5 SOLUTION/ DROPS OPHTHALMIC at 12:56

## 2018-02-12 RX ADMIN — LIDOCAINE HYDROCHLORIDE 1 ML: 20 INJECTION, SOLUTION EPIDURAL; INFILTRATION; INTRACAUDAL; PERINEURAL at 15:47

## 2018-02-12 ASSESSMENT — ENCOUNTER SYMPTOMS: DYSRHYTHMIAS: 1

## 2018-02-12 NOTE — OP NOTE
Patient:    Anastasiia Eng                                                Reg No:     [id number]                                               Date:  2018                                                :   10/13/1932                                                   Attending:  HOLLIE LOZANO M.D.                                                 Surgeon:    HOLLIE LOZANO M.D.                                   Service Dt: Ophthalmology        OPERATIVE REPORT        ANESTHESIA:   Monitored anesthetic care with local and subTenon's block of mixture of two mls of lidocaine 2%, two mls of Marcaine 0.75% and hyaluronidase injected into inferior-nasal subTenon s space.      PREOPERATIVE DIAGNOSIS (ES):   OAG, right eye     POSTOPERATIVE DIAGNOSIS (ES):   The same     NAME OF OPERATION:   1. Tube shunt placement, right eye, placed into supra-Tenon's space, with 3-0 prolene ripcord.  Tube shunt model:   Molteno 3  Tube shunt number:   Lot [id number], item #[id number]    2.   Corneal patch, right eye       COMPLICATIONS:  None.      SPECIMENS REMOVED:   None.     INDICATIONS FOR PROCEDURE:  The patient is a 85 year old female, s/p right trabeculectomy, right trabeculectomy revision, with unsatisfactory controlled intraocular pressure on maximum medical treatment and progressive visual field loss. The benefits, alternatives and risks of the procedure including the risk of infection, inflammation, bleeding, blindness, need for another procedure or additional glaucoma medications, hypotony, elevated intraocular pressure were discussed with the patient. The patient understood the benefits, alternatives and risks associated with the procedure and has elected to proceed with the surgery.     DESCRIPTION OF PROCEDURE:   After the appropriate pre-operative consent was obtained the patient was taken to the operating room and hooked up to cardiorespiratory monitoring by the anesthesia team.  Monitored anesthetic care was  provided by the anesthesia team. The time out was taken to identify the patient, the surgery and the eye. The patient was draped in usual sterile ophthalmic fashion. Tetracaine drops were instilled into the eye. Lid speculum was placed into the eye. SubTenon block was given into inferior-nasal subtenon s space.      6-0 silk traction suture was placed into superior cornea and the eye was infraducted. The 6-0 silk suture was secured to the sterile drape with hemostat. Conjunctival peritomy was performed at the limbus superior-temporally utilizing straight smooth forceps and Sheela's scissors. The conjunctiva and Tenon s were dissected towards fornix in a separate manner. Cautery was used to archive hemostasis.  Tube shunt was placed on top of the Tenons and secured to the sclera with 8-0 nylon. The tube was primed with BSS and truncated to an adequate length. -0 prolene ripcord was placed in the tube. The base of the tube was ligated with 7-0 vycryl to ensure that it was watertight. A #15 blade was used to create slits on one side of the tube. The AC was re-filled with amnivisc through previously placed paracentesis wound. Angled crescent blade and 23-gauge needle on a TB syringe was used to create a scleral entry for the tube into the AC. The tube was inserted into the AC. The outer part of the tube was covered with corneal patch. The patch was secured to the sclera with 7-0 vycryl sutures. Conjunctiva and closed with 8-0 vycryl.  At the end of the surgery the AC was deep, the tube was in good position and no leak was identified. The lid speculum was removed. Cefuroxime 0.1 cc was injected intracamerally and 0.3 cc was injected into sub-conjuctival space inferiorly. 0.05 cc of kenalog was injected into superior subconjunctival space over the plate. Maxitrol ointment was placed in the eye. The eye was patched and shielded in standard ophthalmic fashion.      DISPOSITION: The patient was taken to the recovery room  in stable condition having tolerated the procedure well. The patient will be given post-operative instructions and seen in the eye clinic tomorrow.     SPONGE/INSTRUMENT/NEEDLE COUNTS:   All sponge and needle counts were correct at the end of the case.     ____________________________   Diana Melvin M.D.

## 2018-02-12 NOTE — ANESTHESIA PREPROCEDURE EVALUATION
Anesthesia Evaluation     . Pt has had prior anesthetic.     No history of anesthetic complications          ROS/MED HX    ENT/Pulmonary:      (-) sleep apnea   Neurologic:       Cardiovascular:     (+) ----. : . . . :. dysrhythmias a-fib, . pulmonary hypertension,       METS/Exercise Tolerance:     Hematologic:         Musculoskeletal: Comment: Chronic low back pain  (+) arthritis, , , -       GI/Hepatic:     (+) GERD Asymptomatic on medication,       Renal/Genitourinary:         Endo:     (+) thyroid problem .      Psychiatric:         Infectious Disease:         Malignancy:         Other:                     Physical Exam      Airway   Mallampati: II  TM distance: >3 FB  Neck ROM: full    Dental   (+) chipped    Cardiovascular   Rhythm and rate: regular      Pulmonary    breath sounds clear to auscultation                    Anesthesia Plan      History & Physical Review  History and physical reviewed and following examination; no interval change.    ASA Status:  2 .        Plan for MAC with Intravenous induction.   PONV prophylaxis:  Ondansetron (or other 5HT-3)       Postoperative Care  Postoperative pain management:  IV analgesics.      Consents  Anesthetic plan, risks, benefits and alternatives discussed with:  Patient..                          .

## 2018-02-12 NOTE — ANESTHESIA CARE TRANSFER NOTE
Patient: Anastasiia Eng    Procedure(s):  RIGHT  EYE INSERT TUBE SHUNT  - Wound Class: I-Clean    Diagnosis: GLAUCOMA   Diagnosis Additional Information: No value filed.    Anesthesia Type:   MAC     Note:  Airway :Room Air  Patient transferred to:PACU  Comments: Awake, alert, VSS, report to RN, monitors and alarms on, IV patent. Handoff Report: Identifed the Patient, Identified the Reponsible Provider, Reviewed the pertinent medical history, Discussed the surgical course, Reviewed Intra-OP anesthesia mangement and issues during anesthesia, Set expectations for post-procedure period and Allowed opportunity for questions and acknowledgement of understanding      Vitals: (Last set prior to Anesthesia Care Transfer)    CRNA VITALS  2/12/2018 1545 - 2/12/2018 1619      2/12/2018             SpO2: 96 %    Resp Rate (set): 10    EKG: Sinus rhythm                Electronically Signed By: GRACIA Peterson CRNA  February 12, 2018  4:19 PM

## 2018-02-12 NOTE — DISCHARGE INSTRUCTIONS
[facility] Discharge  Instructions  Anesthesia ([facility])   Adult Discharge Instructions    For 24 hours after surgery    1. Get plenty of rest.  Make arrangements to have a responsible adult stay with you for at least 6 hours after you leave the hospital.  2. Do not drive or use heavy equipment for 24 hours.    3. Do not drink alcohol for 24 hours.  4. Do not sign legal documents or make important decisions for 24 hours.  5. Avoid strenuous or risky activities. You may feel lightheaded.  If so, sit for a few minutes before standing.  Have someone help you get up.   6. Conscious sedation patients may resume a regular diet..  7. Any questions of medical nature, call your physician.              Discharge Instructions  Post-Operative Glaucoma Surgery  Dr. Melvin  Tel: [phone] [address], [address]  [place] [address]      Pain Management:      Some mild discomfort is normal following surgery.  If you are currently taking any medications for pain, you may continue to take what you normally do.  Otherwise, you may take an over-the-counter medication such as Tylenol (acetaminophen) or ibuprofen (Advil) for relief.  Take as instructed on the bottle.    If you are experiencing uncontrollable pain or have other concerns, call [phone].    Other Medications:    No eye drops to the operative eye tonight.  Please remember to bring all your drops/supplies with you to your post-op appointment.  You will be instructed on the drops at that time.    Do not take glaucoma pills.    You may resume your regular home medications, including any drops you are using in your NON-operative eye.      General Rules:    Avoid strenuous activity. Do not do anything that would cause you to strain or make your face red. Open your mouth if you cough, use a laxative if necessary, do not lift greater than 5 pounds.    Keep your head above your heart.  Do not bend down lower than waist  level.  Bend with your knees.    Keep the bandage over your eye.  The doctor will remove the bandage at your appointment at the clinic.    Try to write down any questions you may have to bring to your post-op appointment.    Be restful today.

## 2018-02-12 NOTE — IP AVS SNAPSHOT
[facility]    [address]    [place] [address]    Phone:  [phone]                                       After Visit Summary   2/12/2018    Anastasiia Eng    MRN: [id number]           After Visit Summary Signature Page     I have received my discharge instructions, and my questions have been answered. I have discussed any challenges I see with this plan with the nurse or doctor.    ..........................................................................................................................................  Patient/Patient Representative Signature      ..........................................................................................................................................  Patient Representative Print Name and Relationship to Patient    ..................................................               ................................................  Date                                            Time    ..........................................................................................................................................  Reviewed by Signature/Title    ...................................................              ..............................................  Date                                                            Time

## 2018-02-12 NOTE — IP AVS SNAPSHOT
MRN:[id number]                      After Visit Summary   2/12/2018    Anastasiia Eng    MRN: [id number]           Thank you!     Thank you for choosing [facility] for your care. Our goal is always to provide you with excellent care. Hearing back from our patients is one way we can continue to improve our services. Please take a few minutes to complete the written survey that you may receive in the mail after you visit with us. Thank you!        Patient Information     Date Of Birth          10/13/1932        About your hospital stay     You were admitted on:  February 12, 2018 You last received care in the:  [facility]    You were discharged on:  February 12, 2018       Who to Call     For medical emergencies, please call 911.  For non-urgent questions about your medical care, please call your primary care provider or clinic, [phone]  For questions related to your surgery, please call your surgery clinic        Attending Provider     Provider Diana Mccollum MD Ophthalmology       Primary Care Provider Office Phone # Fax #    Nayeli EscobarGRACIA Mora [phone]      Further instructions from your care team       [facility] Discharge  Instructions  Anesthesia ([facility])   Adult Discharge Instructions    For 24 hours after surgery    1. Get plenty of rest.  Make arrangements to have a responsible adult stay with you for at least 6 hours after you leave the hospital.  2. Do not drive or use heavy equipment for 24 hours.    3. Do not drink alcohol for 24 hours.  4. Do not sign legal documents or make important decisions for 24 hours.  5. Avoid strenuous or risky activities. You may feel lightheaded.  If so, sit for a few minutes before standing.  Have someone help you get up.   6. Conscious sedation patients may resume a regular diet..  7. Any questions of medical nature, call your  "physician.              Discharge Instructions  Post-Operative Glaucoma Surgery  Dr. Melvin  Tel: [phone] [address], [address]  [place] [address]      Pain Management:      Some mild discomfort is normal following surgery.  If you are currently taking any medications for pain, you may continue to take what you normally do.  Otherwise, you may take an over-the-counter medication such as Tylenol (acetaminophen) or ibuprofen (Advil) for relief.  Take as instructed on the bottle.    If you are experiencing uncontrollable pain or have other concerns, call [phone].    Other Medications:    No eye drops to the operative eye tonight.  Please remember to bring all your drops/supplies with you to your post-op appointment.  You will be instructed on the drops at that time.    Do not take glaucoma pills.    You may resume your regular home medications, including any drops you are using in your NON-operative eye.      General Rules:    Avoid strenuous activity. Do not do anything that would cause you to strain or make your face red. Open your mouth if you cough, use a laxative if necessary, do not lift greater than 5 pounds.    Keep your head above your heart.  Do not bend down lower than waist level.  Bend with your knees.    Keep the bandage over your eye.  The doctor will remove the bandage at your appointment at the clinic.    Try to write down any questions you may have to bring to your post-op appointment.    Be restful today.    Pending Results     No orders found from 2/10/2018 to 2/13/2018.            Admission Information     Date & Time Provider Department Dept. Phone    2/12/2018 Diana Melvin MD [facility] [phone]      Your Vitals Were     Blood Pressure Pulse Temperature Respirations Height Weight    142/66 67 97  F (36.1  C) (Temporal) 16 1.562 m (5' 1.5\") 64 kg (141 lb)    Pulse Oximetry BMI (Body Mass Index)                98% 26.21 kg/m2        " "  MyChart Information     [employer] lets you send messages to your doctor, view your test results, renew your prescriptions, schedule appointments and more. To sign up, go to [email or web address] . Click on \"Log in\" on the left side of the screen, which will take you to the Welcome page. Then click on \"Sign up Now\" on the right side of the page.     You will be asked to enter the access code listed below, as well as some personal information. Please follow the directions to create your username and password.     Your access code is: [email or web address]  Expires: 2018  3:57 PM     Your access code will  in 90 days. If you need help or a new code, please call your [facility] clinic or [phone].        Care EveryWhere ID     This is your Care EveryWhere ID. This could be used by other organizations to access your [facility] medical records  [id number]        Equal Access to Services     KANCHAN ROBLES : Dulce Maria fontaine Soervin, waaxda lutrey, qaybta kaalmada adekenia, aysha almaguer . So [facility] [phone].    ATENCIÓN: Si jimla español, tiene a harmon disposición servicios gratuitos de asistencia lingüística. Llame al [phone].    We comply with applicable federal civil rights laws and [place] laws. We do not discriminate on the basis of race, color, national origin, age, disability, sex, sexual orientation, or gender identity.               Review of your medicines      UNREVIEWED medicines. Ask your doctor about these medicines        Dose / Directions    cholestyramine 4 G Packet   Commonly known as:  QUESTRAN        Dose:  1 packet   Take 1 packet by mouth daily   Refills:  0       cyanocobalamin 1000 MCG/ML injection   Commonly known as:  VITAMIN B12        Dose:  1 mL   Inject 1 mL into the muscle every 30 days   Refills:  0       EFFEXOR XR PO        Dose:  75 mg   Take 75 mg by mouth. Daily with food   Refills:  0       estradiol 0.1 MG/GM cream   Commonly known as:  " ESTRACE        Dose:  2 g   Place 2 g vaginally twice a week   Refills:  0       latanoprost 0.005 % ophthalmic solution   Commonly known as:  XALATAN        Dose:  1 drop   1 drop At Bedtime   Refills:  0       LEVAQUIN PO        Take by mouth daily   Refills:  0       LUMIGAN 0.03 % ophthalmic solution   Generic drug:  bimatoprost        Dose:  1 drop   1 drop 2 times daily Every evening to affected eye; 1 drop   Refills:  0       METHIMAZOLE PO        Dose:  5 mg   Take 5 mg by mouth every other day   Refills:  0       METOPROLOL SUCCINATE ER PO        Dose:  25 mg   Take 25 mg by mouth daily   Refills:  0       omeprazole 20 MG tablet        Dose:  20 mg   Take 20 mg by mouth daily.   Refills:  0       RANITIDINE HCL PO        Dose:  300 mg   Take 300 mg by mouth daily   Refills:  0       WARFARIN SODIUM PO        Dose:  5 mg   Take 5 mg by mouth daily 5 mg on Mon, Wed, Fri 1/2 tab all other days   Refills:  0         START taking        Dose / Directions    moxifloxacin 0.5 % ophthalmic solution   Commonly known as:  VIGAMOX   Used for:  Primary open angle glaucoma of right eye, severe stage        Dose:  1 drop   Place 1 drop into the right eye 4 times daily for 15 days   Quantity:  3 mL   Refills:  0            Where to get your medicines      These medications were sent to [facility] [place], [place] - [address]  [address] [facility] [address], [place] [address]     Phone:  [phone]     moxifloxacin 0.5 % ophthalmic solution                Protect others around you: Learn how to safely use, store and throw away your medicines at www.disposemymeds.org.        ANTIBIOTIC INSTRUCTION     You've Been Prescribed an Antibiotic - Now What?  Your healthcare team thinks that you or your loved one might have an infection. Some infections can be treated with antibiotics, which are powerful, life-saving drugs. Like all medications, antibiotics have side effects and should only be used when necessary.  There are some important things you should know about your antibiotic treatment.      Your healthcare team may run tests before you start taking an antibiotic.    Your team may take samples (e.g., from your blood, urine or other areas) to run tests to look for bacteria. These test can be important to determine if you need an antibiotic at all and, if you do, which antibiotic will work best.      Within a few days, your healthcare team might change or even stop your antibiotic.    Your team may start you on an antibiotic while they are working to find out what is making you sick.    Your team might change your antibiotic because test results show that a different antibiotic would be better to treat your infection.    In some cases, once your team has more information, they learn that you do not need an antibiotic at all. They may find out that you don't have an infection, or that the antibiotic you're taking won't work against your infection. For example, an infection caused by a virus can't be treated with antibiotics. Staying on an antibiotic when you don't need it is more likely to be harmful than helpful.      You may experience side effects from your antibiotic.    Like all medications, antibiotics have side effects. Some of these can be serious.    Let you healthcare team know if you have any known allergies when you are admitted to the hospital.    One significant side effect of nearly all antibiotics is the risk of severe and sometimes deadly diarrhea caused by Clostridium difficile (C. Difficile). This occurs when a person takes antibiotics because some good germs are destroyed. Antibiotic use allows C. diificile to take over, putting patients at high risk for this serious infection.    As a patient or caregiver, it is important to understand your or your loved one's antibiotic treatment. It is especially important for caregivers to speak up when patients can't speak for themselves. Here are some important  questions to ask your healthcare team.    What infection is this antibiotic treating and how do you know I have that infection?    What side effects might occur from this antibiotic?    How long will I need to take this antibiotic?    Is it safe to take this antibiotic with other medications or supplements (e.g., vitamins) that I am taking?     Are there any special directions I need to know about taking this antibiotic? For example, should I take it with food?    How will I be monitored to know whether my infection is responding to the antibiotic?    What tests may help to make sure the right antibiotic is prescribed for me?      Information provided by:  www.cdc.gov/getsmart  U.S. Department of Health and Human Services  Centers for disease Control and Prevention  National Center for Emerging and Zoonotic Infectious Diseases  Division of Healthcare Quality Promotion             Medication List: This is a list of all your medications and when to take them. Check marks below indicate your daily home schedule. Keep this list as a reference.      Medications           Morning Afternoon Evening Bedtime As Needed    cholestyramine 4 G Packet   Commonly known as:  QUESTRAN   Take 1 packet by mouth daily                                cyanocobalamin 1000 MCG/ML injection   Commonly known as:  VITAMIN B12   Inject 1 mL into the muscle every 30 days                                EFFEXOR XR PO   Take 75 mg by mouth. Daily with food                                estradiol 0.1 MG/GM cream   Commonly known as:  ESTRACE   Place 2 g vaginally twice a week                                latanoprost 0.005 % ophthalmic solution   Commonly known as:  XALATAN   1 drop At Bedtime                                LEVAQUIN PO   Take by mouth daily                                LUMIGAN 0.03 % ophthalmic solution   1 drop 2 times daily Every evening to affected eye; 1 drop   Generic drug:  bimatoprost                                 METHIMAZOLE PO   Take 5 mg by mouth every other day                                METOPROLOL SUCCINATE ER PO   Take 25 mg by mouth daily                                moxifloxacin 0.5 % ophthalmic solution   Commonly known as:  VIGAMOX   Place 1 drop into the right eye 4 times daily for 15 days                                omeprazole 20 MG tablet   Take 20 mg by mouth daily.                                RANITIDINE HCL PO   Take 300 mg by mouth daily                                WARFARIN SODIUM PO   Take 5 mg by mouth daily 5 mg on Mon, Wed, Fri 1/2 tab all other days

## 2018-02-12 NOTE — ANESTHESIA POSTPROCEDURE EVALUATION
Patient: Anastasiia Eng    Procedure(s):  RIGHT  EYE INSERT TUBE SHUNT  - Wound Class: I-Clean    Diagnosis:GLAUCOMA   Diagnosis Additional Information: No value filed.    Anesthesia Type:  MAC    Note:  Anesthesia Post Evaluation    Patient location during evaluation: PACU  Patient participation: Able to fully participate in evaluation  Level of consciousness: awake  Pain management: adequate  Airway patency: patent  Cardiovascular status: acceptable  Respiratory status: acceptable  Hydration status: acceptable  PONV: none     Anesthetic complications: None          Last vitals:  Vitals:    02/12/18 1252   BP: 142/66   Pulse: 67   Resp: 16   Temp: 36.1  C (97  F)   SpO2: 98%         Electronically Signed By: Yudy June MD  February 12, 2018  4:20 PM

## 2018-02-13 NOTE — ANESTHESIA POSTPROCEDURE EVALUATION
Patient: Anastasiia Eng    Procedure(s):  RIGHT  EYE INSERT TUBE SHUNT  - Wound Class: I-Clean    Diagnosis:GLAUCOMA   Diagnosis Additional Information: No value filed.    Anesthesia Type:  MAC    Note:  Anesthesia Post Evaluation    Patient location during evaluation: PACU  Patient participation: Able to fully participate in evaluation  Level of consciousness: awake  Pain management: adequate  Airway patency: patent  Cardiovascular status: acceptable  Respiratory status: acceptable  Hydration status: acceptable  PONV: controlled     Anesthetic complications: None          Last vitals:  Vitals:    02/12/18 1252 02/12/18 1615 02/12/18 1630   BP: 142/66 141/79 150/74   Pulse: 67     Resp: 16 16 16   Temp: 36.1  C (97  F)     SpO2: 98% 97% 97%         Electronically Signed By: Chacho Rodriguez MD  February 12, 2018  8:41 PM

## 2020-11-04 ENCOUNTER — TRANSFERRED RECORDS (OUTPATIENT)
Dept: HEALTH INFORMATION MANAGEMENT | Facility: CLINIC | Age: 85
End: 2020-11-04

## 2021-02-13 ENCOUNTER — HEALTH MAINTENANCE LETTER (OUTPATIENT)
Age: 86
End: 2021-02-13

## 2021-02-17 ENCOUNTER — IMMUNIZATION (OUTPATIENT)
Dept: NURSING | Facility: CLINIC | Age: 86
End: 2021-02-17
Payer: COMMERCIAL

## 2021-02-17 PROCEDURE — 91300 PR COVID VAC PFIZER DIL RECON 30 MCG/0.3 ML IM: CPT

## 2021-02-17 PROCEDURE — 0001A PR COVID VAC PFIZER DIL RECON 30 MCG/0.3 ML IM: CPT

## 2021-09-19 ENCOUNTER — HEALTH MAINTENANCE LETTER (OUTPATIENT)
Age: 86
End: 2021-09-19

## 2022-03-06 ENCOUNTER — HEALTH MAINTENANCE LETTER (OUTPATIENT)
Age: 87
End: 2022-03-06

## 2022-11-20 ENCOUNTER — HEALTH MAINTENANCE LETTER (OUTPATIENT)
Age: 87
End: 2022-11-20

## 2023-04-15 ENCOUNTER — HEALTH MAINTENANCE LETTER (OUTPATIENT)
Age: 88
End: 2023-04-15

## (undated) DEVICE — NDL 23GAX1.25" 305120

## (undated) DEVICE — EYE CANN IRR 30GA  ANTERIOR CHAMBER 581273

## (undated) DEVICE — EYE SPONGE SPEAR WECK CEL 0008685

## (undated) DEVICE — SU SILK 6-0 C-1 18" 707G

## (undated) DEVICE — TAPE MICROPORE 2"X1.5YD 1530S-2

## (undated) DEVICE — EYE SHIELD PLASTIC

## (undated) DEVICE — LINEN TOWEL PACK X5 5464

## (undated) DEVICE — SYR 10ML LL W/O NDL

## (undated) DEVICE — SU SILK 6-0 G-1DA 18" 780G

## (undated) DEVICE — Device

## (undated) DEVICE — BLADE KNIFE BEAVER MINI BEAVER6400

## (undated) DEVICE — SU VICRYL 8-0 BV130-4 5' J405G

## (undated) DEVICE — EYE DRSG PAD OVAL

## (undated) DEVICE — NDL 25GA 1.5" 305127

## (undated) DEVICE — NDL 30GA 0.5" 305106

## (undated) DEVICE — EYE KNIFE CRESCENT 55DEG 373807

## (undated) DEVICE — SU VICRYL 7-0 TG140-8DA 18" J546G

## (undated) DEVICE — BLADE KNIFE BEAVER MICROSHARP GREEN 377515

## (undated) DEVICE — TAPE MICROPORE 1"X1.5YD 1530S-1

## (undated) DEVICE — PACK CATARACT CUSTOM SO DALE SEY32CTFCX

## (undated) DEVICE — SU PROLENE 3-0 SHDA 48" 8534H

## (undated) DEVICE — NDL 18GA 1.5" 305196

## (undated) DEVICE — SU ETHILON 10-0 CS160-6 12" 9000G

## (undated) DEVICE — SYR 01ML 25GA 5/8" TBC

## (undated) DEVICE — SU ETHILON 8-0 TG175-8 12" 1716G

## (undated) DEVICE — GLOVE PROTEXIS MICRO 7.0  2D73PM70

## (undated) DEVICE — BLADE KNIFE BEAVER 376700

## (undated) RX ORDER — TRIAMCINOLONE ACETONIDE 40 MG/ML
INJECTION, SUSPENSION INTRA-ARTICULAR; INTRAMUSCULAR
Status: DISPENSED
Start: 2018-02-12

## (undated) RX ORDER — DEXAMETHASONE SODIUM PHOSPHATE 4 MG/ML
INJECTION, SOLUTION INTRA-ARTICULAR; INTRALESIONAL; INTRAMUSCULAR; INTRAVENOUS; SOFT TISSUE
Status: DISPENSED
Start: 2017-09-28

## (undated) RX ORDER — TETRACAINE HYDROCHLORIDE 5 MG/ML
SOLUTION OPHTHALMIC
Status: DISPENSED
Start: 2018-02-12

## (undated) RX ORDER — TETRACAINE HYDROCHLORIDE 5 MG/ML
SOLUTION OPHTHALMIC
Status: DISPENSED
Start: 2017-09-28

## (undated) RX ORDER — WATER 10 ML/10ML
INJECTION INTRAMUSCULAR; INTRAVENOUS; SUBCUTANEOUS
Status: DISPENSED
Start: 2017-09-28

## (undated) RX ORDER — CEFTAZIDIME 1 G/1
INJECTION, POWDER, FOR SOLUTION INTRAMUSCULAR; INTRAVENOUS
Status: DISPENSED
Start: 2017-09-28

## (undated) RX ORDER — ONDANSETRON 2 MG/ML
INJECTION INTRAMUSCULAR; INTRAVENOUS
Status: DISPENSED
Start: 2017-09-28

## (undated) RX ORDER — FENTANYL CITRATE 50 UG/ML
INJECTION, SOLUTION INTRAMUSCULAR; INTRAVENOUS
Status: DISPENSED
Start: 2018-02-12